# Patient Record
Sex: MALE | Race: WHITE
[De-identification: names, ages, dates, MRNs, and addresses within clinical notes are randomized per-mention and may not be internally consistent; named-entity substitution may affect disease eponyms.]

---

## 2021-07-02 ENCOUNTER — HOSPITAL ENCOUNTER (INPATIENT)
Dept: HOSPITAL 96 - M.ERS | Age: 41
LOS: 3 days | Discharge: HOME | DRG: 419 | End: 2021-07-05
Attending: INTERNAL MEDICINE | Admitting: INTERNAL MEDICINE
Payer: COMMERCIAL

## 2021-07-02 VITALS — HEIGHT: 67.99 IN | WEIGHT: 170 LBS | BODY MASS INDEX: 25.76 KG/M2

## 2021-07-02 VITALS — DIASTOLIC BLOOD PRESSURE: 53 MMHG | SYSTOLIC BLOOD PRESSURE: 145 MMHG

## 2021-07-02 DIAGNOSIS — K80.62: Primary | ICD-10-CM

## 2021-07-02 DIAGNOSIS — Z20.822: ICD-10-CM

## 2021-07-02 LAB
ABSOLUTE BASOPHILS: 0.1 THOU/UL (ref 0–0.2)
ABSOLUTE EOSINOPHILS: 0.1 THOU/UL (ref 0–0.7)
ABSOLUTE MONOCYTES: 0.8 THOU/UL (ref 0–1.2)
ALBUMIN SERPL-MCNC: 4 G/DL (ref 3.4–5)
ALP SERPL-CCNC: 87 U/L (ref 46–116)
ALT SERPL-CCNC: 58 U/L (ref 30–65)
ANION GAP SERPL CALC-SCNC: 6 MMOL/L (ref 7–16)
AST SERPL-CCNC: 29 U/L (ref 15–37)
BASOPHILS NFR BLD AUTO: 0.5 %
BILIRUB SERPL-MCNC: 0.3 MG/DL
BUN SERPL-MCNC: 22 MG/DL (ref 7–18)
CALCIUM SERPL-MCNC: 8.7 MG/DL (ref 8.5–10.1)
CHLORIDE SERPL-SCNC: 103 MMOL/L (ref 98–107)
CO2 SERPL-SCNC: 31 MMOL/L (ref 21–32)
CREAT SERPL-MCNC: 1 MG/DL (ref 0.6–1.3)
EOSINOPHIL NFR BLD: 1.3 %
GLUCOSE SERPL-MCNC: 107 MG/DL (ref 70–99)
GRANULOCYTES NFR BLD MANUAL: 65.4 %
HCT VFR BLD CALC: 44.6 % (ref 42–52)
HGB BLD-MCNC: 15.1 GM/DL (ref 14–18)
LIPASE: 153 U/L (ref 73–393)
LYMPHOCYTES # BLD: 2.8 THOU/UL (ref 0.8–5.3)
LYMPHOCYTES NFR BLD AUTO: 25.3 %
MAGNESIUM SERPL-MCNC: 2.4 MG/DL (ref 1.8–2.4)
MCH RBC QN AUTO: 29.6 PG (ref 26–34)
MCHC RBC AUTO-ENTMCNC: 33.9 G/DL (ref 28–37)
MCV RBC: 87.3 FL (ref 80–100)
MONOCYTES NFR BLD: 7.5 %
MPV: 8.9 FL. (ref 7.2–11.1)
NEUTROPHILS # BLD: 7.3 THOU/UL (ref 1.6–8.1)
NUCLEATED RBCS: 0 /100WBC
PLATELET COUNT*: 251 THOU/UL (ref 150–400)
POTASSIUM SERPL-SCNC: 4 MMOL/L (ref 3.5–5.1)
PROT SERPL-MCNC: 8.5 G/DL (ref 6.4–8.2)
RBC # BLD AUTO: 5.11 MIL/UL (ref 4.5–6)
RDW-CV: 13.8 % (ref 10.5–14.5)
SODIUM SERPL-SCNC: 140 MMOL/L (ref 136–145)
WBC # BLD AUTO: 11.1 THOU/UL (ref 4–11)

## 2021-07-02 NOTE — OP
[facility] 
[address] [place]  [address]                    OPERATIVE REPORT              
_______________________________________________________________________________
 
Name:       KAYLA ZHAO        Room:           [address] [place]  
M.R.#:  [id number]      Account #:      [id number]  
Admission:  07/03/21     Attend Phys:    Edilson Stanley MD 
Discharge:               Date of Birth:  [birth year]  
         Report #: [id number]
                                                                     [id number]
_______________________________________________________________________________
THIS REPORT FOR:  
 
cc:  JUSTINE - No family physician/PCP 
     FAM - No family physician/PCP                                        
     aTvo Aguero MD                                          ~
 
DOC #: [id number]
Tavo Aguero MD
DATE OF SURGERY: 07/04/2021
 
PREOPERATIVE DIAGNOSIS:  Acute cholecystitis.
 
POSTOPERATIVE DIAGNOSIS:  Acute cholecystitis.
 
OPERATION:  Laparoscopic cholecystectomy.
 
SURGEON:  Tavo Aguero MD
 
ANESTHESIA:  General.
 
ESTIMATED BLOOD LOSS:  Minimal.
 
SPECIMENS:  Gallbladder.
 
DESCRIPTION OF PROCEDURE:  After informed consent was obtained, the patient was 
brought to the operating room and placed supine.  SCDs were placed and working, 
preoperative antibiotics were administered, general anesthesia was induced.  The
abdomen was prepped and draped in usual sterile fashion.  A 10 mm incision was 
made below the umbilicus.  Fascia was incised and a trocar was placed.  
Pneumoperitoneum was established.  Three right upper quadrant 5 mm ports were 
placed.  Gallbladder was grasped at the fundus and retracted cephalad.  
Infundibulum was grasped and retracted laterally.  I dissected out the cystic 
duct and cystic artery.  Cystic duct and artery were clipped and ligated.  After
the cystic plate was fully identified.  The gallbladder was then taken off the 
liver bed with electrocautery.  It was placed into an Endopouch and removed.  
The fascia was then closed with a figure-of-eight 0 Vicryl.  Skin was closed 
with 4-0 Monocryl.  Incisions were sealed with Steri-Strips.
 
COMPLICATIONS:  None.
 
DISPOSITION:  The patient was taken to recovery in satisfactory condition.
 
MD JUANIS Louis/[place]
 
 
 
 
[place] [address]                    OPERATIVE REPORT              
_______________________________________________________________________________
 
Name:       KAYLA ZHAO        Room:           [address]#:  [id number]      Account #:      [id number]  
Admission:  07/03/21     Attend Phys:    Edilson Stanley MD 
Discharge:               Date of Birth:  [birth year]  
         Report #: [id number]
                                                                     [id number]
_______________________________________________________________________________
 
D: 07/04/2021 12:37 PM
T: 07/04/2021 12:55 PM
 
 
 
 
 
 
 
 
 
 
 
 
 
 
 
 
 
 
 
 
 
 
 
 
 
 
 
 
 
 
 
 
 
 
 
 
 
 
 
 
 
                       
                                        By:                                
                 
D: 07/04/21 1137_______________________________________
T: 07/04/21 1155Tavo Aguero MD        /nt

## 2021-07-03 VITALS — SYSTOLIC BLOOD PRESSURE: 120 MMHG | DIASTOLIC BLOOD PRESSURE: 77 MMHG

## 2021-07-03 VITALS — DIASTOLIC BLOOD PRESSURE: 67 MMHG | SYSTOLIC BLOOD PRESSURE: 110 MMHG

## 2021-07-03 VITALS — SYSTOLIC BLOOD PRESSURE: 111 MMHG | DIASTOLIC BLOOD PRESSURE: 72 MMHG

## 2021-07-03 VITALS — SYSTOLIC BLOOD PRESSURE: 110 MMHG | DIASTOLIC BLOOD PRESSURE: 62 MMHG

## 2021-07-03 VITALS — SYSTOLIC BLOOD PRESSURE: 109 MMHG | DIASTOLIC BLOOD PRESSURE: 77 MMHG

## 2021-07-03 NOTE — [place] [address]
Phone:  [phone]                     ELECTROCARDIOGRAM REPORT      
_______________________________________________________________________________
 
Name:         KAYLA ZHAO       Room:          [address]
M.R.#:    [id number]     Account #:     [id number]  
Admission:    21    Attend Phys:   Edilson Stanley, 
Discharge:                Date of Birth: [birth year]  
Date of Service: 21 2326  Report #:      [id number]
        [id number]
_______________________________________________________________________________
THIS REPORT FOR:  //name//                      
 
                         [facility] ED
                                       
Test Date:    2021               Test Time:    23:26:26
Pat Name:     KAYLA DAY  Department:   
Patient ID:   [id number]            Room:         [place] [address]
Gender:       M                        Technician:   VANESSA
:          1980               Requested By: Clarice Bright
Order Number: [id number]    Lona MD:   Grant Mckinney
                                 Measurements
Intervals                              Axis          
Rate:         64                       P:            35
TX:           174                      QRS:          54
QRSD:         87                       T:            50
QT:           386                                    
QTc:          399                                    
                           Interpretive Statements
Sinus rhythm
ST elev, probable normal early repol pattern
No previous ECG available for comparison
Electronically Signed On 7-3-2021 11:40:59 CDT by Grant Mckinney
[email or web address]
 
 
 
 
 
 
 
 
 
 
 
 
 
 
 
 
 
 
 
 
 
  <ELECTRONICALLY SIGNED>
                                           By: Grant Mckinney MD, [facility]      
  21     1140
D: 21 2326   _____________________________________
T: 21 2326   Grant Mckinney MD, [facility]        /EPI

## 2021-07-03 NOTE — NUR
PT ALERT AND ORIENTED X 4. IVF REMAIN INFUSING AT THIS TIME. PT UP AD JUAN. PT
CONTINUES TO DENY PAIN AND NAUSEA THIS SHIFT. SURGICAL CONSULT CALLED TO DR. SAVAGE. PT TO BE NPO AFTER MIDNIGHT. WILL CONTINUE TO MONITOR.

## 2021-07-03 NOTE — NUR
CONSULT CALLED TO DR. SAVAGE. INFORMED OF NEW PATIENT CONSULT. STATES TO
MAKE PT NPO AFTER MIDNIGHT. WILL SEE TOMORROW.

## 2021-07-03 NOTE — NUR
REPORT RECIEVED FROM AVNI KERR AT BEDSIDE. PT SETTLED IN BED. PT NPO AT
THIS TIME. CALL LIGHT WITHIN REACH. WILL CONTINUE TO MONITOR.

## 2021-07-04 VITALS — SYSTOLIC BLOOD PRESSURE: 120 MMHG | DIASTOLIC BLOOD PRESSURE: 81 MMHG

## 2021-07-04 VITALS — DIASTOLIC BLOOD PRESSURE: 75 MMHG | SYSTOLIC BLOOD PRESSURE: 116 MMHG

## 2021-07-04 VITALS — DIASTOLIC BLOOD PRESSURE: 66 MMHG | SYSTOLIC BLOOD PRESSURE: 116 MMHG

## 2021-07-04 LAB
ALBUMIN SERPL-MCNC: 3.1 G/DL (ref 3.4–5)
ALP SERPL-CCNC: 73 U/L (ref 46–116)
ALT SERPL-CCNC: 54 U/L (ref 30–65)
ANION GAP SERPL CALC-SCNC: 5 MMOL/L (ref 7–16)
AST SERPL-CCNC: 24 U/L (ref 15–37)
BILIRUB SERPL-MCNC: 0.5 MG/DL
BUN SERPL-MCNC: 15 MG/DL (ref 7–18)
CALCIUM SERPL-MCNC: 8.3 MG/DL (ref 8.5–10.1)
CHLORIDE SERPL-SCNC: 107 MMOL/L (ref 98–107)
CO2 SERPL-SCNC: 30 MMOL/L (ref 21–32)
CREAT SERPL-MCNC: 1.1 MG/DL (ref 0.6–1.3)
GLUCOSE SERPL-MCNC: 85 MG/DL (ref 70–99)
POTASSIUM SERPL-SCNC: 4.2 MMOL/L (ref 3.5–5.1)
PROT SERPL-MCNC: 7 G/DL (ref 6.4–8.2)
SODIUM SERPL-SCNC: 142 MMOL/L (ref 136–145)

## 2021-07-04 PROCEDURE — 0FT44ZZ RESECTION OF GALLBLADDER, PERCUTANEOUS ENDOSCOPIC APPROACH: ICD-10-PCS | Performed by: SURGERY

## 2021-07-04 NOTE — NUR
PT A&O X 4. VSS ON RA.  NO C/O PAIN, N/V. UP INDEPENDENTLY IN ROOM. IVF
INFUISING. NPO SINCE MIDNIGHT FOR SURGERY CONSULT. CALL LIGHT WITHIN REACH.
WILL CONTINUE TO MONITOR.

## 2021-07-04 NOTE — NUR
PT REMAINED ALERT AND ORIENTED. PT RESTING IN BED. SURGERY THIS SHIFT,
INCISION C/D/I. PAIN MEDS GIVEN AS ORDERED. FALL RISK PRECAUTIONS IN PLACE.
HOURLY ROUNDING COMPLETED. CALL LIGHT WITHIN REACH.

## 2021-07-05 VITALS — SYSTOLIC BLOOD PRESSURE: 104 MMHG | DIASTOLIC BLOOD PRESSURE: 65 MMHG

## 2021-07-05 VITALS — SYSTOLIC BLOOD PRESSURE: 99 MMHG | DIASTOLIC BLOOD PRESSURE: 64 MMHG

## 2021-07-05 VITALS — DIASTOLIC BLOOD PRESSURE: 65 MMHG | SYSTOLIC BLOOD PRESSURE: 104 MMHG

## 2021-07-05 VITALS — DIASTOLIC BLOOD PRESSURE: 72 MMHG | SYSTOLIC BLOOD PRESSURE: 112 MMHG

## 2021-07-05 LAB
ANION GAP SERPL CALC-SCNC: 9 MMOL/L (ref 7–16)
APTT BLD: 24 SECONDS (ref 25–31.3)
BUN SERPL-MCNC: 16 MG/DL (ref 7–18)
CALCIUM SERPL-MCNC: 8.4 MG/DL (ref 8.5–10.1)
CHLORIDE SERPL-SCNC: 106 MMOL/L (ref 98–107)
CO2 SERPL-SCNC: 26 MMOL/L (ref 21–32)
CREAT SERPL-MCNC: 1.1 MG/DL (ref 0.6–1.3)
GLUCOSE SERPL-MCNC: 118 MG/DL (ref 70–99)
HCT VFR BLD CALC: 41.6 % (ref 42–52)
HGB BLD-MCNC: 14.1 GM/DL (ref 14–18)
INR PPP: 1
MAGNESIUM SERPL-MCNC: 2.3 MG/DL (ref 1.8–2.4)
MCH RBC QN AUTO: 29.5 PG (ref 26–34)
MCHC RBC AUTO-ENTMCNC: 34 G/DL (ref 28–37)
MCV RBC: 86.5 FL (ref 80–100)
MPV: 9.2 FL. (ref 7.2–11.1)
PLATELET COUNT*: 253 THOU/UL (ref 150–400)
POTASSIUM SERPL-SCNC: 4.2 MMOL/L (ref 3.5–5.1)
PROTHROMBIN TIME: 10.3 SECONDS (ref 9.2–11.5)
RBC # BLD AUTO: 4.8 MIL/UL (ref 4.5–6)
RDW-CV: 13.6 % (ref 10.5–14.5)
SODIUM SERPL-SCNC: 141 MMOL/L (ref 136–145)
WBC # BLD AUTO: 12.4 THOU/UL (ref 4–11)

## 2021-07-05 NOTE — NUR
PATIENT HAS REMAINED ALERT AND ORIENTED X 4 THROUGHOUT THE SHIFT AND RESTING
QUIETLY ON HOURLY ROUNDS. UP INDEPENDENTLY TO BR. POST-OP VOIDS WITHOUT
DIFFICULTY. LAP SITES WITH STERI-STRIPS. GAUZE ALSO OVER UMBILICUS SITE. VITAL
SIGNS STABLE. ANTIBIOTICS PER ORDER. MINAMAL DISCOMFORT AND HAS NOT REQUIRED
PAIN MEDICATIONS THIS SHIFT. CONTINUE TO MONITOR.

## 2021-07-05 NOTE — NUR
PT GIVEN DISCHARGE INFORMATION, WORK EXCUSE, PRESCRIPTIONS, CARE NOTES, AND
AFTER CARE INFORMATION. IV REMOVED. PT BELONGINGS GATHERED. FALL RISK
PRECAUTIONS IN PLACE. HOURLY ROUNDING COMPLETED. PT LEFT VIA WHEELCHAIR WITH
NURSING STAFF TO HOME.

## 2021-07-07 NOTE — PATH
[address]  [address]                    PATHOLOGY RPT PROCEDURE       
_______________________________________________________________________________
 
Name:       ROBLES KAYLA DAY        Room:           [address] [place]  
[facility].#:  [id number]      Account #:      [id number]  
Admission:  07/03/21     Date of Birth:  [birth year]  
Discharge:  07/05/21                   Report #:    [id number]
                                                         Path Case #: [id number]
_______________________________________________________________________________
 
[facility] Accession Number: [id number]
.                                                                01
Material submitted:                                        .
gallbladder - GALLBLADDER
.                                                                01
Clinical history:                                          .
LAPAROSCOPIC CHOLECYSTECTOMY
ABDOMINAL PAIN
BILIARY COLIC
.                                                                02
**********************************************************************
Diagnosis:
Gallbladder:
- Chronic cholecystitis and cholelithiasis.
(NURIS/db; 7/07/2021)
LBQ  07/07/2021  1524 Local
**********************************************************************
.                                                                02
Electronically signed:                                     .
Stephan Hernández MD, Pathologist
NPI- [id number]
.                                                                01
Gross description:                                         .
Fixative: Formalin
Labeled: Gallbladder
Specimen received: Intact gallbladder
Dimensions: 10.0 x 3.5 x 3.0 cm
Serosa: Pink-tan and congested at the fundus
Lymph node: Not present
Mucosa: Velvety to smooth, green bile-stained
Average wall thickness: 0.2-0.4 cm
Calculi: Multiple mistry calculi ranging from 0.1-0.7 cm
Abnormalities: No grossly apparent lesions
A1- Representative body, fundus, and the cystic duct margin. ([facility];
7/6/2021)
.
[facility]  07/06/2021  2234 Local
.                                                                02
Pathologist provided ICD-10:
K80.10
.                                                                02
CPT                                                        .
993136
Specimen Comment: A courtesy copy of this report has been sent to [phone]
[phone]
Specimen Comment: [address] [place] [address]                    PATHOLOGY RPT PROCEDURE       
_______________________________________________________________________________
 
Name:       ROBLES KAYLA DAY        Room:           M.[address]    [place]  
M.R.#:  [id number]      Account #:      [id number]  
Admission:  07/03/21     Date of Birth:  [birth year]  
Discharge:  07/05/21                   Report #:    [id number]
                                                         Path Case #: [id number]
_______________________________________________________________________________
Specimen Comment: Report sent to  / DR CLEVELAND
***Performed at:  01
   [facility] [place]
   [address], [place], [place]  [id number]
   MD James Monae MD Phone:  [phone]
***Performed at:  02
   [facility]
   [address], [place], [place]  [id number]
   MD Stephan Hernández MD Phone:  [phone]